# Patient Record
Sex: FEMALE | Race: WHITE | Employment: OTHER | URBAN - METROPOLITAN AREA
[De-identification: names, ages, dates, MRNs, and addresses within clinical notes are randomized per-mention and may not be internally consistent; named-entity substitution may affect disease eponyms.]

---

## 2021-07-17 ENCOUNTER — APPOINTMENT (OUTPATIENT)
Dept: GENERAL RADIOLOGY | Age: 76
End: 2021-07-17
Payer: MEDICARE

## 2021-07-17 ENCOUNTER — HOSPITAL ENCOUNTER (EMERGENCY)
Age: 76
Discharge: HOME | End: 2021-07-17
Attending: EMERGENCY MEDICINE
Payer: MEDICARE

## 2021-07-17 VITALS
OXYGEN SATURATION: 96 % | RESPIRATION RATE: 16 BRPM | TEMPERATURE: 97.6 F | DIASTOLIC BLOOD PRESSURE: 80 MMHG | HEART RATE: 103 BPM | SYSTOLIC BLOOD PRESSURE: 153 MMHG

## 2021-07-17 DIAGNOSIS — S52.022A CLOSED FRACTURE OF OLECRANON PROCESS OF LEFT ULNA, INITIAL ENCOUNTER: Primary | ICD-10-CM

## 2021-07-17 PROCEDURE — 73090 X-RAY EXAM OF FOREARM: CPT

## 2021-07-17 PROCEDURE — 99284 EMERGENCY DEPT VISIT MOD MDM: CPT

## 2021-07-17 PROCEDURE — 29125 APPL SHORT ARM SPLINT STATIC: CPT

## 2021-07-17 PROCEDURE — 73070 X-RAY EXAM OF ELBOW: CPT

## 2021-07-17 RX ORDER — HYDROCODONE BITARTRATE AND ACETAMINOPHEN 5; 325 MG/1; MG/1
1 TABLET ORAL EVERY 6 HOURS PRN
Qty: 10 TABLET | Refills: 0 | Status: SHIPPED | OUTPATIENT
Start: 2021-07-17 | End: 2021-07-20

## 2021-07-17 ASSESSMENT — ENCOUNTER SYMPTOMS
SHORTNESS OF BREATH: 0
WHEEZING: 0
ABDOMINAL PAIN: 0
BACK PAIN: 0
BLOOD IN STOOL: 0
DIARRHEA: 0
NAUSEA: 0
VOMITING: 0
CHEST TIGHTNESS: 0
CONSTIPATION: 0
COUGH: 0

## 2021-07-17 ASSESSMENT — PAIN DESCRIPTION - ORIENTATION: ORIENTATION: LEFT

## 2021-07-17 ASSESSMENT — PAIN DESCRIPTION - PAIN TYPE: TYPE: ACUTE PAIN

## 2021-07-17 ASSESSMENT — PAIN DESCRIPTION - LOCATION: LOCATION: ARM

## 2021-07-17 ASSESSMENT — PAIN SCALES - GENERAL
PAINLEVEL_OUTOF10: 8
PAINLEVEL_OUTOF10: 1

## 2021-07-17 ASSESSMENT — PAIN DESCRIPTION - DESCRIPTORS: DESCRIPTORS: ACHING

## 2021-07-17 NOTE — ED PROVIDER NOTES
[address]      Pt Name: Navarro Milan  MRN: [id number]  [place] 1945  Date of evaluation: 7/17/2021      CHIEF COMPLAINT       Chief Complaint   Patient presents with    Arm Injury     mechanical fall last night        HPI  Pawan Rubio is a 77 y.o. female  Presents with elbow pain after fall last night. She states she fell from standing onto the her forearm, medial aspect with no wrist involvement. She denies any sensation loss, but does have elbow pain with flexion and extension of her foream. Pt rates her pain 7/10, worst with movement, no alleviating symptoms noted. Pt denies LOC,  No head trauma, no headache, n/v, cp, sob, abd pain, diarrhea, dysuria, hematuria. NKA      Except as noted above the remainder of the review of systems was reviewed and negative. Review of Systems   Constitutional: Negative for activity change, appetite change, chills, fatigue and fever. HENT: Negative for congestion, nosebleeds and tinnitus. Eyes: Negative for visual disturbance. Respiratory: Negative for cough, chest tightness, shortness of breath and wheezing. Cardiovascular: Negative for chest pain, palpitations and leg swelling. Gastrointestinal: Negative for abdominal pain, blood in stool, constipation, diarrhea, nausea and vomiting. Genitourinary: Negative for dysuria, flank pain, hematuria and vaginal bleeding. Musculoskeletal: Negative for back pain, gait problem and neck pain. Skin: Negative for pallor, rash and wound. Neurological: Negative for dizziness, syncope, weakness, numbness and headaches. Physical Exam  Constitutional:       General: She is not in acute distress. Appearance: Normal appearance. She is normal weight. She is not ill-appearing, toxic-appearing or diaphoretic. HENT:      Head: Normocephalic and atraumatic.       Right Ear: External ear normal.      Left Ear: External ear normal.      Nose: Nose normal.      Mouth/Throat:      Mouth: Mucous membranes are moist.      Pharynx: Oropharynx is clear. Eyes:      Extraocular Movements: Extraocular movements intact. Conjunctiva/sclera: Conjunctivae normal.      Pupils: Pupils are equal, round, and reactive to light. Cardiovascular:      Rate and Rhythm: Normal rate and regular rhythm. Pulses: Normal pulses. Pulmonary:      Effort: Pulmonary effort is normal.      Breath sounds: Normal breath sounds. Abdominal:      General: Abdomen is flat. Bowel sounds are normal.      Palpations: Abdomen is soft. Tenderness: There is no abdominal tenderness. There is no guarding. Musculoskeletal:         General: Tenderness present. No swelling or deformity. Left upper arm: Normal.      Left elbow: Decreased range of motion. Tenderness present. Left forearm: Tenderness present. Left wrist: Normal.   Skin:     General: Skin is warm and dry. Capillary Refill: Capillary refill takes less than 2 seconds. Findings: No bruising, erythema or lesion. Neurological:      General: No focal deficit present. Mental Status: She is alert and oriented to person, place, and time. Mental status is at baseline. Psychiatric:         Mood and Affect: Mood normal.          Procedures     L arm posterior splint    Orthoglass applied to posterior arm- splinted at 90 angle with no complications  Neuro and circulation appropriate post splint. Pt tolerated procedure well, pain controlled. MDM     67yo Presents with elbow pain after fall last night. She states she fell from standing onto the her forearm, medial aspect with no wrist involvement. In the ED pt was HD stable, with tenderness of L forarm and elbow associated with decreased range of motion of the leftforearm. XR identified possible occult fracture of the left elbow involving radial head. Pt was placed on posterior split with no complications.  Pt comfortable following up with Ortho for further management. Patient is awake alert, hemodynamic stable, afebrile and in no respiratory distress. Discussed with patient plan for close outpatient follow-up with ortho as well as return precautions and the patient understands and agrees to the plan.                  --------------------------------------------- PAST HISTORY ---------------------------------------------  Past Medical History:  has a past medical history of Depression, Diabetes mellitus ([address] Hyperlipidemia, and Hypertension. Past Surgical History:  has no past surgical history on file. Social History:  reports that she has never smoked. She does not have any smokeless tobacco history on file. She reports previous alcohol use. She reports previous drug use. Family History: family history is not on file. The patients home medications have been reviewed. Allergies: Patient has no known allergies. -------------------------------------------------- RESULTS -------------------------------------------------  Labs:  No results found for this visit on 07/17/21. Radiology:  XR ELBOW LEFT (2 VIEWS)   Final Result   An avulsion fracture from the olecranon process with prominent anterior and   posterior fat pad sign concerning for occult fracture of the left elbow. There is a possible occult fracture involving the radial head. No additional fractures are identified in the distal shaft of the left radius   and ulna. XR RADIUS ULNA LEFT (2 VIEWS)   Final Result   An avulsion fracture from the olecranon process with prominent anterior and   posterior fat pad sign concerning for occult fracture of the left elbow. There is a possible occult fracture involving the radial head. No additional fractures are identified in the distal shaft of the left radius   and ulna.              ------------------------- NURSING NOTES AND VITALS REVIEWED ---------------------------  Date / Time Roomed:  7/17/2021  9:32 AM  ED Bed Assignment:  12/12    The nursing notes within the ED encounter and vital signs as below have been reviewed. BP (!) 153/80   Pulse 103   Temp 97.6 °F (36.4 °C) (Tympanic)   Resp 16   SpO2 96%   Oxygen Saturation Interpretation: normal      ------------------------------------------ PROGRESS NOTES ------------------------------------------    I have spoken with the patient and discussed todays results, in addition to providing specific details for the plan of care and counseling regarding the diagnosis and prognosis. Their questions are answered at this time and they are agreeable with the plan. I discussed at length with them reasons for immediate return here for re evaluation. They will followup with their PCP by calling their office tomorrow. --------------------------------- ADDITIONAL PROVIDER NOTES ---------------------------------  At this time the patient is without objective evidence of an acute process requiring hospitalization or inpatient management. They have remained hemodynamically stable throughout their entire ED visit and are stable for discharge with outpatient follow-up. The plan has been discussed in detail and they are aware of the specific conditions for emergent return, as well as the importance of follow-up. Discharge Medication List as of 7/17/2021 12:35 PM      START taking these medications    Details   HYDROcodone-acetaminophen (NORCO) 5-325 MG per tablet Take 1 tablet by mouth every 6 hours as needed for Pain for up to 3 days. Intended supply: 3 days. Take lowest dose possible to manage pain, Disp-10 tablet, R-0Print             Diagnosis:  1. Closed fracture of olecranon process of left ulna, initial encounter        Disposition:  Patient's disposition: Discharge to home  Patient's condition is stable.       Ifeanyi Sanchez MD  Resident  07/17/21 [address], MD  Resident  07/17/21 [id number]

## 2023-10-03 ENCOUNTER — TELEPHONE (OUTPATIENT)
Dept: ULTRASOUND IMAGING | Age: 78
End: 2023-10-03

## 2023-10-03 NOTE — TELEPHONE ENCOUNTER
Laverne Luther from Dr. Cheri Porter office called and stated the patient has decided not to have US thyroid biopsy done. Paperwork in cancel file.   Electronically signed by Joanne Naidu on 10/3/2023 at 3:08 PM

## 2025-05-05 DIAGNOSIS — M25.562 LEFT KNEE PAIN, UNSPECIFIED CHRONICITY: Primary | ICD-10-CM

## 2025-05-06 ENCOUNTER — HOSPITAL ENCOUNTER (OUTPATIENT)
Dept: GENERAL RADIOLOGY | Age: 80
Discharge: HOME | End: 2025-05-08
Payer: MEDICARE

## 2025-05-06 ENCOUNTER — HOSPITAL ENCOUNTER (OUTPATIENT)
Age: 80
Discharge: HOME | End: 2025-05-08
Payer: MEDICARE

## 2025-05-06 DIAGNOSIS — M25.562 LEFT KNEE PAIN, UNSPECIFIED CHRONICITY: ICD-10-CM

## 2025-05-06 PROCEDURE — 73562 X-RAY EXAM OF KNEE 3: CPT

## 2025-05-09 ENCOUNTER — OFFICE VISIT (OUTPATIENT)
Age: 80
End: 2025-05-09

## 2025-05-09 DIAGNOSIS — M11.262 CHONDROCALCINOSIS OF LEFT KNEE: ICD-10-CM

## 2025-05-09 DIAGNOSIS — M17.12 PRIMARY LOCALIZED OSTEOARTHRITIS OF LEFT KNEE: Primary | ICD-10-CM

## 2025-05-09 RX ORDER — SITAGLIPTIN 100 MG/1
100 TABLET, FILM COATED ORAL DAILY
COMMUNITY
Start: 2025-05-01

## 2025-05-09 RX ORDER — LIDOCAINE HYDROCHLORIDE 10 MG/ML
4 INJECTION, SOLUTION INFILTRATION; PERINEURAL ONCE
Status: COMPLETED | OUTPATIENT
Start: 2025-05-09 | End: 2025-05-09

## 2025-05-09 RX ORDER — BETAMETHASONE SODIUM PHOSPHATE AND BETAMETHASONE ACETATE 3; 3 MG/ML; MG/ML
6 INJECTION, SUSPENSION INTRA-ARTICULAR; INTRALESIONAL; INTRAMUSCULAR; SOFT TISSUE ONCE
Status: COMPLETED | OUTPATIENT
Start: 2025-05-09 | End: 2025-05-09

## 2025-05-09 RX ORDER — FLUOXETINE HYDROCHLORIDE 40 MG/1
40 CAPSULE ORAL NIGHTLY
COMMUNITY
Start: 2025-04-29

## 2025-05-09 RX ORDER — LOSARTAN POTASSIUM 50 MG/1
50 TABLET ORAL DAILY
COMMUNITY
Start: 2025-05-01

## 2025-05-09 RX ORDER — BUTALBITAL, ASPIRIN, AND CAFFEINE 325; 50; 40 MG/1; MG/1; MG/1
2 CAPSULE ORAL 2 TIMES DAILY
COMMUNITY
Start: 2025-04-23

## 2025-05-09 RX ORDER — DOXEPIN HYDROCHLORIDE 100 MG/1
100 CAPSULE ORAL NIGHTLY
COMMUNITY
Start: 2025-04-30

## 2025-05-09 RX ORDER — GLIMEPIRIDE 4 MG/1
4 TABLET ORAL 2 TIMES DAILY
COMMUNITY
Start: 2025-02-12

## 2025-05-09 RX ADMIN — BETAMETHASONE SODIUM PHOSPHATE AND BETAMETHASONE ACETATE 6 MG: 3; 3 INJECTION, SUSPENSION INTRA-ARTICULAR; INTRALESIONAL; INTRAMUSCULAR; SOFT TISSUE at 14:06

## 2025-05-09 RX ADMIN — LIDOCAINE HYDROCHLORIDE 4 ML: 10 INJECTION, SOLUTION INFILTRATION; PERINEURAL at 14:01

## 2025-05-09 NOTE — PROGRESS NOTES
Chief Complaint   Patient presents with    Follow-up     Patient presents into the office today for a follow-up of the L knee. She states the injection she received in August 2024 provided relief for a few months.     Knee Pain     Patient describes the pain as aching more so when standing for long periods of time and walking long distances. She rates the level of pain at a 0 / 10        Candi Digregory returns today for follow-up of her left knee pain. she reports this is worse than when I saw her last.  We gave her an injection years ago and it was doing well but more recently she is having a lot of problems she says that it is tough that she has a tough time with any endurance of standing for long periods of time.  She denies any new injuries or new medications.  It has not been locking up or catching on her either.  The patient's pain level is a No data recorded .       Past Medical History:   Diagnosis Date    Depression     Diabetes mellitus (HCC)     Hyperlipidemia     Hypertension      No past surgical history on file.    Current Outpatient Medications:     doxepin (SINEQUAN) 100 MG capsule, Take 1 capsule by mouth nightly, Disp: , Rfl:     FLUoxetine (PROZAC) 40 MG capsule, Take 1 capsule by mouth at bedtime, Disp: , Rfl:     JANUVIA 100 MG tablet, Take 1 tablet by mouth daily, Disp: , Rfl:     glimepiride (AMARYL) 4 MG tablet, Take 1 tablet by mouth 2 times daily, Disp: , Rfl:     losartan (COZAAR) 50 MG tablet, Take 1 tablet by mouth daily, Disp: , Rfl:     butalbital-aspirin-caffeine (FIORINAL) -40 MG capsule, Take 2 capsules by mouth in the morning and at bedtime., Disp: , Rfl:   No Known Allergies  Social History     Socioeconomic History    Marital status:      Spouse name: Not on file    Number of children: Not on file    Years of education: Not on file    Highest education level: Not on file   Occupational History    Not on file   Tobacco Use    Smoking status: Never    Smokeless